# Patient Record
Sex: FEMALE | Race: BLACK OR AFRICAN AMERICAN | URBAN - METROPOLITAN AREA
[De-identification: names, ages, dates, MRNs, and addresses within clinical notes are randomized per-mention and may not be internally consistent; named-entity substitution may affect disease eponyms.]

---

## 2021-02-26 ENCOUNTER — OFFICE VISIT (OUTPATIENT)
Dept: URBAN - METROPOLITAN AREA CLINIC 80 | Facility: CLINIC | Age: 51
End: 2021-02-26

## 2021-04-29 ENCOUNTER — OFFICE VISIT (OUTPATIENT)
Dept: URBAN - METROPOLITAN AREA CLINIC 128 | Facility: CLINIC | Age: 51
End: 2021-04-29
Payer: COMMERCIAL

## 2021-04-29 ENCOUNTER — WEB ENCOUNTER (OUTPATIENT)
Dept: URBAN - METROPOLITAN AREA CLINIC 128 | Facility: CLINIC | Age: 51
End: 2021-04-29

## 2021-04-29 DIAGNOSIS — R11.2 INTRACTABLE VOMITING WITH NAUSEA, UNSPECIFIED VOMITING TYPE: ICD-10-CM

## 2021-04-29 DIAGNOSIS — Z12.11 COLON CANCER SCREENING: ICD-10-CM

## 2021-04-29 DIAGNOSIS — R10.13 EPIGASTRIC PAIN: ICD-10-CM

## 2021-04-29 DIAGNOSIS — K21.9 GASTROESOPHAGEAL REFLUX DISEASE, UNSPECIFIED WHETHER ESOPHAGITIS PRESENT: ICD-10-CM

## 2021-04-29 PROCEDURE — 99203 OFFICE O/P NEW LOW 30 MIN: CPT | Performed by: INTERNAL MEDICINE

## 2021-04-29 NOTE — HPI-OTHER HISTORIES
The patient offers no personal history of colon polyps or colon cancer.  There is no family history of colon polyps or colon cancer.   No heart disease and no blood thinners.   There is hx of asthma on advair daily and albuterol as rescue inhaler.  No overt GI bleeding.  No weight loss.   Bowel movements are regular and daily.  There is also a hx of GERD managed with pantoprazole for the past 2 years.  There is also a recurrent epigastric pain with N/V for the past several years that is occurring more and more frequently and lasting longer.  The pain and nausea usually wakes her from sleep about 0300 and last for a few hours, recently lasting as long as 8 hours.  no hx of liver, gallbladder, or pancreatic disease.  No regular NSAIDs.  BMs are daily.

## 2021-05-28 ENCOUNTER — OFFICE VISIT (OUTPATIENT)
Dept: URBAN - METROPOLITAN AREA CLINIC 80 | Facility: CLINIC | Age: 51
End: 2021-05-28

## 2021-05-28 ENCOUNTER — OFFICE VISIT (OUTPATIENT)
Dept: URBAN - METROPOLITAN AREA SURGERY CENTER 31 | Facility: SURGERY CENTER | Age: 51
End: 2021-05-28

## 2021-07-14 ENCOUNTER — OFFICE VISIT (OUTPATIENT)
Dept: URBAN - METROPOLITAN AREA SURGERY CENTER 31 | Facility: SURGERY CENTER | Age: 51
End: 2021-07-14
Payer: COMMERCIAL

## 2021-07-14 ENCOUNTER — CLAIMS CREATED FROM THE CLAIM WINDOW (OUTPATIENT)
Dept: URBAN - METROPOLITAN AREA CLINIC 4 | Facility: CLINIC | Age: 51
End: 2021-07-14
Payer: COMMERCIAL

## 2021-07-14 DIAGNOSIS — D12.3 ADENOMA OF TRANSVERSE COLON: ICD-10-CM

## 2021-07-14 DIAGNOSIS — D12.3 BENIGN NEOPLASM OF TRANSVERSE COLON: ICD-10-CM

## 2021-07-14 DIAGNOSIS — Z12.11 COLON CANCER SCREENING: ICD-10-CM

## 2021-07-14 PROCEDURE — 45380 COLONOSCOPY AND BIOPSY: CPT | Performed by: INTERNAL MEDICINE

## 2021-07-14 PROCEDURE — 88305 TISSUE EXAM BY PATHOLOGIST: CPT | Performed by: PATHOLOGY

## 2021-07-14 PROCEDURE — G8907 PT DOC NO EVENTS ON DISCHARG: HCPCS | Performed by: INTERNAL MEDICINE

## 2021-07-14 PROCEDURE — 45385 COLONOSCOPY W/LESION REMOVAL: CPT | Performed by: INTERNAL MEDICINE

## 2023-09-26 ENCOUNTER — OFFICE VISIT (OUTPATIENT)
Dept: URBAN - METROPOLITAN AREA CLINIC 128 | Facility: CLINIC | Age: 53
End: 2023-09-26
Payer: COMMERCIAL

## 2023-09-26 VITALS
HEART RATE: 78 BPM | TEMPERATURE: 98.1 F | HEIGHT: 60 IN | WEIGHT: 184.8 LBS | SYSTOLIC BLOOD PRESSURE: 134 MMHG | DIASTOLIC BLOOD PRESSURE: 80 MMHG | BODY MASS INDEX: 36.28 KG/M2

## 2023-09-26 DIAGNOSIS — D75.839 THROMBOCYTOSIS: ICD-10-CM

## 2023-09-26 DIAGNOSIS — A04.8 BACTERIAL INFECTION DUE TO H. PYLORI: ICD-10-CM

## 2023-09-26 DIAGNOSIS — R79.89 ELEVATED LFTS: ICD-10-CM

## 2023-09-26 DIAGNOSIS — B17.9 ACUTE HEPATITIS: ICD-10-CM

## 2023-09-26 PROBLEM — 428283002: Status: ACTIVE | Noted: 2023-09-26

## 2023-09-26 PROBLEM — 66931009: Status: ACTIVE | Noted: 2023-09-26

## 2023-09-26 PROBLEM — 15167005: Status: ACTIVE | Noted: 2023-09-26

## 2023-09-26 PROCEDURE — 99215 OFFICE O/P EST HI 40 MIN: CPT | Performed by: PHYSICIAN ASSISTANT

## 2023-09-26 RX ORDER — ASCORBIC ACID 1000 MG
1 CAPSULE TABLET ORAL ONCE A DAY
Status: ACTIVE | COMMUNITY

## 2023-09-26 RX ORDER — FLUOXETINE 20 MG/1
1 CAPSULE CAPSULE ORAL ONCE A DAY
Status: ACTIVE | COMMUNITY

## 2023-09-26 RX ORDER — AMOXICILLIN 500 MG/1
2 CAPSULES CAPSULE ORAL
Qty: 56 CAPSULE | Refills: 0 | OUTPATIENT
Start: 2023-09-26 | End: 2023-10-10

## 2023-09-26 RX ORDER — CLARITHROMYCIN 500 MG/1
1 TABLET TABLET, FILM COATED ORAL
Qty: 28 TABLET | Refills: 0 | OUTPATIENT
Start: 2023-09-26 | End: 2023-10-10

## 2023-09-26 RX ORDER — MULTIVIT-MIN/IRON/FOLIC ACID/K 18-600-40
AS DIRECTED CAPSULE ORAL
Status: ACTIVE | COMMUNITY

## 2023-09-26 RX ORDER — ALPRAZOLAM 1 MG/1
1 TABLET TABLET ORAL TWICE A DAY
Status: ACTIVE | COMMUNITY

## 2023-09-26 RX ORDER — FLUTICASONE PROPIONATE 50 UG/1
1 SPRAY IN EACH NOSTRIL SPRAY, METERED NASAL ONCE A DAY
Status: ACTIVE | COMMUNITY

## 2023-09-26 RX ORDER — MELOXICAM 15 MG/1
1 TABLET TABLET ORAL ONCE A DAY
Status: ACTIVE | COMMUNITY

## 2023-09-26 RX ORDER — AZELASTINE HCL 205.5 UG/1
2 SPRAYS IN EACH NOSTRIL SPRAY NASAL ONCE A DAY
Status: ACTIVE | COMMUNITY

## 2023-09-26 RX ORDER — TRIAMTERENE AND HYDROCHLOROTHIAZIDE 37.5; 25 MG/1; MG/1
1 TABLET IN THE MORNING TABLET ORAL ONCE A DAY
Status: ACTIVE | COMMUNITY

## 2023-09-26 RX ORDER — GABAPENTIN 600 MG/1
1 TABLET TABLET, FILM COATED ORAL ONCE A DAY
Status: ACTIVE | COMMUNITY

## 2023-09-26 RX ORDER — ALBUTEROL SULFATE 90 UG/1
1 PUFF AS NEEDED AEROSOL, METERED RESPIRATORY (INHALATION)
Status: ACTIVE | COMMUNITY

## 2023-09-26 RX ORDER — ATORVASTATIN CALCIUM 80 MG/1
1 TABLET TABLET, FILM COATED ORAL ONCE A DAY
Status: ON HOLD | COMMUNITY

## 2023-09-26 RX ORDER — LANSOPRAZOLE 30 MG/1
1 CAPSULE CAPSULE, DELAYED RELEASE ORAL TWICE A DAY
Qty: 28 CAPSULE | Refills: 0 | OUTPATIENT
Start: 2023-09-26

## 2023-09-26 NOTE — HPI-TODAY'S VISIT:
The patient was admitted to [facility] from 9/5/23-9/7/23 for abdominal pain, elevated liver function tests, acute kidney injury, nausea/vomiting, and fever. She was diagnosed with having acute hepatitis potentionally alcoholic versus drug induced, essential hypertension, alcohol abuse, and acute right ankle pain with negative xray- probably a strain. 9/7/23 alk phos 425, , , tortal bili 2.9. She underwent an EGD and EUS without any obstructive process identified in the hepatobiliary system. It was thought she might had had drug induced cholestasis from her recent start of lipitor vs alcoholic hepatitits. Lipitor was discontinued. Her abdominal and pelvic CT scan revealdd multiple prominent gastrohepatic and retroperitoneal lymph nodes, nonspeciific and possibly reactive. RUQ US was normal. EGD revealed chronic active gastritis and H pylori bacteria, no dysplasia noted. EGD with EUS revealed edema and erythema of the gastric mucosa, fatty liver noted, anechoic gallbladder walls 2.8mm, dilated common bile duct and common hepatic duct common an common hepatic duct 7mm but tapers normally no filling defect. Abnormal pancreatic parenchyma in the body and tail area, heterogenicity areas of maybe pancreatic edema.  Her PCP labs done  through her PCP done on 9/19/23 revealed a platelet count of 665, total anson of 0.5, alk phos 184, AST 22, ALT 27, creatinine 1.08, calcium 10.6, . She was started on monjouro 15mg higher dose around the time the LFTs went up. Her lipitor and monjuoro have been held now. She has not had any alcohol since the hospital visit. Before her hospital visit, she was drinking tequila shots 3-6 shots a day three times a week.

## 2023-10-18 LAB
A/G RATIO: 1.3
ABSOLUTE BASOPHILS: 71
ABSOLUTE EOSINOPHILS: 372
ABSOLUTE LYMPHOCYTES: 3304
ABSOLUTE MONOCYTES: 372
ABSOLUTE NEUTROPHILS: 1782
ACTIN (SMOOTH MUSCLE) ANTIBODY (IGG): <20
AFP, SERUM, TUMOR MARKER: 2.8
ALBUMIN: 4.3
ALKALINE PHOSPHATASE: 73
ALKALINE PHOSPHATASE: 79
ALPHA-1-ANTITRYPSIN QN: 115
ALT (SGPT): 14
ANA SCREEN, IFA: POSITIVE
AST (SGOT): 18
BASOPHILS: 1.2
BILIRUBIN, TOTAL: 0.4
BONE ISOENZYMES: 32
BUN/CREATININE RATIO: (no result)
BUN: 18
CALCIUM: 9.7
CARBON DIOXIDE, TOTAL: 26
CERULOPLASMIN: 37
CHLORIDE: 104
CREATININE: 0.97
EGFR: 69
EOSINOPHILS: 6.3
FERRITIN, SERUM: 38
GLOBULIN, TOTAL: 3.4
GLUCOSE: 89
H PYLORI BREATH TEST: NOT DETECTED
HBSAG SCREEN: (no result)
HEMATOCRIT: 38.4
HEMOGLOBIN: 12.4
HEP A AB, IGM: (no result)
HEP B CORE AB, IGM: (no result)
HEPATITIS C ANTIBODY: (no result)
INR: 0.9
INTESTINAL ISOENZYMES: 10
IRON BIND.CAP.(TIBC): 382
IRON SATURATION: 14
IRON: 52
LIVER ISOENZYMES: 58
LYMPHOCYTES: 56
MACROHEPATIC ISOENZYMES: 0
MCH: 29.3
MCHC: 32.3
MCV: 90.8
MITOCHONDRIAL (M2) ANTIBODY: 24.8
MONOCYTES: 6.3
MPV: 8.6
NEUTROPHILS: 30.2
PLACENTAL ISOENZYMES: 0
PLATELET COUNT: 381
POTASSIUM: 4.1
PROTEIN, TOTAL: 7.7
PT: 10
RDW: 13.4
RED BLOOD CELL COUNT: 4.23
SODIUM: 142
WHITE BLOOD CELL COUNT: 5.9

## 2023-11-07 ENCOUNTER — OFFICE VISIT (OUTPATIENT)
Dept: URBAN - METROPOLITAN AREA CLINIC 128 | Facility: CLINIC | Age: 53
End: 2023-11-07
Payer: COMMERCIAL

## 2023-11-07 ENCOUNTER — TELEPHONE ENCOUNTER (OUTPATIENT)
Dept: URBAN - METROPOLITAN AREA CLINIC 128 | Facility: CLINIC | Age: 53
End: 2023-11-07

## 2023-11-07 VITALS
SYSTOLIC BLOOD PRESSURE: 130 MMHG | HEART RATE: 72 BPM | DIASTOLIC BLOOD PRESSURE: 82 MMHG | BODY MASS INDEX: 36.63 KG/M2 | HEIGHT: 60 IN | WEIGHT: 186.6 LBS

## 2023-11-07 DIAGNOSIS — R79.89 ELEVATED LFTS: ICD-10-CM

## 2023-11-07 DIAGNOSIS — R11.0 NAUSEA: ICD-10-CM

## 2023-11-07 DIAGNOSIS — F10.10 ALCOHOL ABUSE: ICD-10-CM

## 2023-11-07 DIAGNOSIS — A04.8 HELICOBACTER PYLORI (H. PYLORI) INFECTION: ICD-10-CM

## 2023-11-07 PROCEDURE — 99214 OFFICE O/P EST MOD 30 MIN: CPT | Performed by: PHYSICIAN ASSISTANT

## 2023-11-07 RX ORDER — ATORVASTATIN CALCIUM 80 MG/1
1 TABLET TABLET, FILM COATED ORAL ONCE A DAY
COMMUNITY

## 2023-11-07 RX ORDER — TRIAMTERENE AND HYDROCHLOROTHIAZIDE 37.5; 25 MG/1; MG/1
1 TABLET IN THE MORNING TABLET ORAL ONCE A DAY
COMMUNITY

## 2023-11-07 RX ORDER — ALPRAZOLAM 1 MG/1
1 TABLET TABLET ORAL TWICE A DAY
COMMUNITY

## 2023-11-07 RX ORDER — ONDANSETRON 4 MG/1
1 TABLET ON THE TONGUE AND ALLOW TO DISSOLVE TABLET, ORALLY DISINTEGRATING ORAL
Qty: 30 | Refills: 0 | OUTPATIENT
Start: 2023-11-07

## 2023-11-07 RX ORDER — MELOXICAM 15 MG/1
1 TABLET TABLET ORAL ONCE A DAY
COMMUNITY

## 2023-11-07 RX ORDER — ASCORBIC ACID 1000 MG
1 CAPSULE TABLET ORAL ONCE A DAY
COMMUNITY

## 2023-11-07 RX ORDER — FLUOXETINE 20 MG/1
1 CAPSULE CAPSULE ORAL ONCE A DAY
COMMUNITY

## 2023-11-07 RX ORDER — ALBUTEROL SULFATE 90 UG/1
1 PUFF AS NEEDED AEROSOL, METERED RESPIRATORY (INHALATION)
COMMUNITY

## 2023-11-07 RX ORDER — AZELASTINE HCL 205.5 UG/1
2 SPRAYS IN EACH NOSTRIL SPRAY NASAL ONCE A DAY
COMMUNITY

## 2023-11-07 RX ORDER — GABAPENTIN 600 MG/1
1 TABLET TABLET, FILM COATED ORAL ONCE A DAY
COMMUNITY

## 2023-11-07 RX ORDER — FLUTICASONE PROPIONATE 50 UG/1
1 SPRAY IN EACH NOSTRIL SPRAY, METERED NASAL ONCE A DAY
COMMUNITY

## 2023-11-07 RX ORDER — MULTIVIT-MIN/IRON/FOLIC ACID/K 18-600-40
AS DIRECTED CAPSULE ORAL
COMMUNITY

## 2023-11-07 RX ORDER — LANSOPRAZOLE 30 MG/1
1 CAPSULE CAPSULE, DELAYED RELEASE ORAL TWICE A DAY
Qty: 28 CAPSULE | Refills: 0 | Status: ON HOLD | COMMUNITY
Start: 2023-09-26

## 2023-11-07 RX ORDER — LANSOPRAZOLE 30 MG/1
1 CAPSULE CAPSULE, DELAYED RELEASE ORAL TWICE A DAY
Qty: 28 CAPSULE | Refills: 0 | OUTPATIENT

## 2023-11-07 NOTE — HPI-TODAY'S VISIT:
Previously, the patient was admitted to [facility] from 9/5/23-9/7/23 for abdominal pain, elevated liver function tests, acute kidney injury, nausea/vomiting, and fever. She was diagnosed with having acute hepatitis potentionally alcoholic versus drug induced, essential hypertension, alcohol abuse, and acute right ankle pain with negative xray- probably a strain. 9/7/23 alk phos 425, , , tortal bili 2.9. She underwent an EGD and EUS without any obstructive process identified in the hepatobiliary system. It was thought she might had had drug induced cholestasis from her recent start of lipitor vs alcoholic hepatitits. Lipitor was discontinued. Her abdominal and pelvic CT scan revealdd multiple prominent gastrohepatic and retroperitoneal lymph nodes, nonspeciific and possibly reactive. RUQ US was normal. EGD revealed chronic active gastritis and H pylori bacteria, no dysplasia noted. EGD with EUS revealed edema and erythema of the gastric mucosa, fatty liver noted, anechoic gallbladder walls 2.8mm, dilated common bile duct and common hepatic duct common an common hepatic duct 7mm but tapers normally no filling defect. Abnormal pancreatic parenchyma in the body and tail area, heterogenicity areas of maybe pancreatic edema.  Her PCP labs done  through her PCP done on 9/19/23 revealed a platelet count of 665, total anson of 0.5, alk phos 184, AST 22, ALT 27, creatinine 1.08, calcium 10.6, . She was started on monjouro 15mg higher dose around the time the LFTs went up. Her lipitor and monjuoro have been held now. She has not had any alcohol since the hospital visit. Before her hospital visit, she was drinking tequila shots 3-6 shots a day three times a week.  Currently, her recent LFTs were normal. 09/2023 total bili 0.4, alk phos 79, AST 18 ALT 14. Calcium was 9.7, platelet count 381. She feels great since her last visit. SHe denies ETOH use.

## 2023-11-28 ENCOUNTER — OFFICE VISIT (OUTPATIENT)
Dept: URBAN - METROPOLITAN AREA CLINIC 98 | Facility: CLINIC | Age: 53
End: 2023-11-28
Payer: COMMERCIAL

## 2023-11-28 VITALS
BODY MASS INDEX: 36.91 KG/M2 | HEIGHT: 60 IN | HEART RATE: 92 BPM | WEIGHT: 188 LBS | TEMPERATURE: 97.2 F | DIASTOLIC BLOOD PRESSURE: 72 MMHG | SYSTOLIC BLOOD PRESSURE: 119 MMHG

## 2023-11-28 DIAGNOSIS — R79.89 ELEVATED LFTS: ICD-10-CM

## 2023-11-28 DIAGNOSIS — E78.5 DYSLIPIDEMIA: ICD-10-CM

## 2023-11-28 PROBLEM — 370992007: Status: ACTIVE | Noted: 2023-11-28

## 2023-11-28 PROCEDURE — 99213 OFFICE O/P EST LOW 20 MIN: CPT | Performed by: INTERNAL MEDICINE

## 2023-11-28 RX ORDER — GABAPENTIN 600 MG/1
1 TABLET TABLET, FILM COATED ORAL ONCE A DAY
COMMUNITY

## 2023-11-28 RX ORDER — MULTIVIT-MIN/IRON/FOLIC ACID/K 18-600-40
AS DIRECTED CAPSULE ORAL
COMMUNITY

## 2023-11-28 RX ORDER — FLUOXETINE 20 MG/1
1 CAPSULE CAPSULE ORAL ONCE A DAY
COMMUNITY

## 2023-11-28 RX ORDER — FLUTICASONE PROPIONATE 50 UG/1
1 SPRAY IN EACH NOSTRIL SPRAY, METERED NASAL ONCE A DAY
COMMUNITY

## 2023-11-28 RX ORDER — ATORVASTATIN CALCIUM 80 MG/1
1 TABLET TABLET, FILM COATED ORAL ONCE A DAY
COMMUNITY

## 2023-11-28 RX ORDER — ALBUTEROL SULFATE 90 UG/1
1 PUFF AS NEEDED AEROSOL, METERED RESPIRATORY (INHALATION)
COMMUNITY

## 2023-11-28 RX ORDER — AZELASTINE HCL 205.5 UG/1
2 SPRAYS IN EACH NOSTRIL SPRAY NASAL ONCE A DAY
COMMUNITY

## 2023-11-28 RX ORDER — TRIAMTERENE AND HYDROCHLOROTHIAZIDE 37.5; 25 MG/1; MG/1
1 TABLET IN THE MORNING TABLET ORAL ONCE A DAY
COMMUNITY

## 2023-11-28 RX ORDER — ASCORBIC ACID 1000 MG
1 CAPSULE TABLET ORAL ONCE A DAY
COMMUNITY

## 2023-11-28 RX ORDER — LANSOPRAZOLE 30 MG/1
1 CAPSULE CAPSULE, DELAYED RELEASE ORAL TWICE A DAY
Qty: 28 CAPSULE | Refills: 0 | Status: ACTIVE | COMMUNITY

## 2023-11-28 RX ORDER — ONDANSETRON 4 MG/1
1 TABLET ON THE TONGUE AND ALLOW TO DISSOLVE TABLET, ORALLY DISINTEGRATING ORAL
Qty: 30 | Refills: 0 | Status: ACTIVE | COMMUNITY
Start: 2023-11-07

## 2023-11-28 RX ORDER — ALPRAZOLAM 1 MG/1
1 TABLET TABLET ORAL TWICE A DAY
COMMUNITY

## 2023-11-28 RX ORDER — MELOXICAM 15 MG/1
1 TABLET TABLET ORAL ONCE A DAY
COMMUNITY

## 2023-11-28 NOTE — HPI-TODAY'S VISIT:
Here w  Sravan Torre had high elevated liver tests  atorvastatin 80mg  now  no more statins alcohol - social - over a week - tequila - over the weekend - 7-8 shots

## 2024-02-06 ENCOUNTER — OV EP (OUTPATIENT)
Dept: URBAN - METROPOLITAN AREA CLINIC 128 | Facility: CLINIC | Age: 54
End: 2024-02-06
Payer: COMMERCIAL

## 2024-02-06 VITALS
BODY MASS INDEX: 38.17 KG/M2 | TEMPERATURE: 97.2 F | WEIGHT: 194.4 LBS | SYSTOLIC BLOOD PRESSURE: 138 MMHG | DIASTOLIC BLOOD PRESSURE: 78 MMHG | HEIGHT: 60 IN

## 2024-02-06 DIAGNOSIS — D75.839 THROMBOCYTOSIS: ICD-10-CM

## 2024-02-06 DIAGNOSIS — A04.8 HELICOBACTER PYLORI (H. PYLORI) INFECTION: ICD-10-CM

## 2024-02-06 DIAGNOSIS — B17.9 ACUTE HEPATITIS: ICD-10-CM

## 2024-02-06 DIAGNOSIS — F10.10 ALCOHOL ABUSE: ICD-10-CM

## 2024-02-06 PROCEDURE — 99214 OFFICE O/P EST MOD 30 MIN: CPT | Performed by: PHYSICIAN ASSISTANT

## 2024-02-06 RX ORDER — ALBUTEROL SULFATE 90 UG/1
1 PUFF AS NEEDED AEROSOL, METERED RESPIRATORY (INHALATION)
Status: ACTIVE | COMMUNITY

## 2024-02-06 RX ORDER — ATORVASTATIN CALCIUM 80 MG/1
1 TABLET TABLET, FILM COATED ORAL ONCE A DAY
Status: ON HOLD | COMMUNITY

## 2024-02-06 RX ORDER — MULTIVIT-MIN/IRON/FOLIC ACID/K 18-600-40
AS DIRECTED CAPSULE ORAL
Status: ACTIVE | COMMUNITY

## 2024-02-06 RX ORDER — AZELASTINE HCL 205.5 UG/1
2 SPRAYS IN EACH NOSTRIL SPRAY NASAL ONCE A DAY
Status: ACTIVE | COMMUNITY

## 2024-02-06 RX ORDER — TRIAMTERENE AND HYDROCHLOROTHIAZIDE 37.5; 25 MG/1; MG/1
1 TABLET IN THE MORNING TABLET ORAL ONCE A DAY
Status: ACTIVE | COMMUNITY

## 2024-02-06 RX ORDER — MELOXICAM 15 MG/1
1 TABLET TABLET ORAL ONCE A DAY
Status: ACTIVE | COMMUNITY

## 2024-02-06 RX ORDER — ASCORBIC ACID 1000 MG
1 CAPSULE TABLET ORAL ONCE A DAY
Status: ACTIVE | COMMUNITY

## 2024-02-06 RX ORDER — ONDANSETRON 4 MG/1
1 TABLET ON THE TONGUE AND ALLOW TO DISSOLVE TABLET, ORALLY DISINTEGRATING ORAL
Qty: 30 | Refills: 0 | OUTPATIENT

## 2024-02-06 RX ORDER — FLUTICASONE PROPIONATE 50 UG/1
1 SPRAY IN EACH NOSTRIL SPRAY, METERED NASAL ONCE A DAY
Status: ACTIVE | COMMUNITY

## 2024-02-06 RX ORDER — LANSOPRAZOLE 30 MG/1
1 CAPSULE CAPSULE, DELAYED RELEASE ORAL TWICE A DAY
Qty: 28 CAPSULE | Refills: 0 | OUTPATIENT

## 2024-02-06 RX ORDER — FLUOXETINE 20 MG/1
1 CAPSULE CAPSULE ORAL ONCE A DAY
Status: ACTIVE | COMMUNITY

## 2024-02-06 RX ORDER — ONDANSETRON 4 MG/1
1 TABLET ON THE TONGUE AND ALLOW TO DISSOLVE TABLET, ORALLY DISINTEGRATING ORAL
Qty: 30 | Refills: 0 | Status: ACTIVE | COMMUNITY
Start: 2023-11-07

## 2024-02-06 RX ORDER — GABAPENTIN 600 MG/1
1 TABLET TABLET, FILM COATED ORAL ONCE A DAY
Status: ACTIVE | COMMUNITY

## 2024-02-06 RX ORDER — LANSOPRAZOLE 30 MG/1
1 CAPSULE CAPSULE, DELAYED RELEASE ORAL TWICE A DAY
Qty: 28 CAPSULE | Refills: 0 | Status: ON HOLD | COMMUNITY

## 2024-02-06 RX ORDER — ALPRAZOLAM 1 MG/1
1 TABLET TABLET ORAL TWICE A DAY
Status: ACTIVE | COMMUNITY

## 2024-02-06 NOTE — HPI-TODAY'S VISIT:
Previously, the patient was admitted to [facility] from 9/5/23-9/7/23 for abdominal pain, elevated liver function tests, acute kidney injury, nausea/vomiting, and fever. She was diagnosed with having acute hepatitis potentionally alcoholic versus drug induced, essential hypertension, alcohol abuse, and acute right ankle pain with negative xray- probably a strain. 9/7/23 alk phos 425, , , tortal bili 2.9. She underwent an EGD and EUS without any obstructive process identified in the hepatobiliary system. It was thought she might had had drug induced cholestasis from her recent start of lipitor vs alcoholic hepatitits. Lipitor was discontinued. Her abdominal and pelvic CT scan revealdd multiple prominent gastrohepatic and retroperitoneal lymph nodes, nonspeciific and possibly reactive. RUQ US was normal. EGD revealed chronic active gastritis and H pylori bacteria, no dysplasia noted. EGD with EUS revealed edema and erythema of the gastric mucosa, fatty liver noted, anechoic gallbladder walls 2.8mm, dilated common bile duct and common hepatic duct common an common hepatic duct 7mm but tapers normally no filling defect. Abnormal pancreatic parenchyma in the body and tail area, heterogenicity areas of maybe pancreatic edema.  Her PCP labs done  through her PCP done on 9/19/23 revealed a platelet count of 665, total anson of 0.5, alk phos 184, AST 22, ALT 27, creatinine 1.08, calcium 10.6, . She was started on monjouro 15mg higher dose around the time the LFTs went up. Her lipitor and monjuoro have been held now. She has not had any alcohol since the hospital visit. Before her hospital visit, she was drinking tequila shots 3-6 shots a day three times a week.  Currently, her recent LFTs were normal. 09/2023 total bili 0.4, alk phos 79, AST 18 ALT 14. Calcium was 9.7, platelet count 381. She feels great since her last visit. SHe denies ETOH use. She had a positve AMA and saw our liver specialist Dr. Red about this. Per Dr. Red, when she sees PCP in a few months - if lipids are still above goal it will be ok at that point, if liver tests are normal, to try on statin at low dose - while liver tests are monitored. Low positive AMA, with normal alk phos level, this does not indicate any chronic liver disease eg PBC. She had a positive ROBERT and saw a rheumatologist about this andwill have further blodo tetsing done today and will follow up with Dr. Delfino Gonzalez on 2/28/24 for followup. She has been on iron twice a day and vitamin C given to her by her PCP.

## 2024-06-21 ENCOUNTER — TELEPHONE ENCOUNTER (OUTPATIENT)
Dept: URBAN - METROPOLITAN AREA CLINIC 128 | Facility: CLINIC | Age: 54
End: 2024-06-21